# Patient Record
Sex: MALE | Race: WHITE | NOT HISPANIC OR LATINO | Employment: FULL TIME | URBAN - METROPOLITAN AREA
[De-identification: names, ages, dates, MRNs, and addresses within clinical notes are randomized per-mention and may not be internally consistent; named-entity substitution may affect disease eponyms.]

---

## 2023-04-24 ENCOUNTER — OFFICE VISIT (OUTPATIENT)
Dept: SURGERY | Facility: CLINIC | Age: 48
End: 2023-04-24
Payer: COMMERCIAL

## 2023-04-24 VITALS — WEIGHT: 220.6 LBS | BODY MASS INDEX: 31.58 KG/M2 | HEIGHT: 70 IN

## 2023-04-24 DIAGNOSIS — Z87.19 HISTORY OF BARRETT'S ESOPHAGUS: Primary | ICD-10-CM

## 2023-04-24 NOTE — PROGRESS NOTES
Cc: History of Ramsey's esophagus, need for screening colonoscopy    History of presenting illness:   This is a nice 47-year-old gentleman with a history of Ramsey's disease of the esophagus.  Last scope done greater than 5 years ago, he is not exactly sure what the results were but believes he was told he still had some benign Rmasey's changes.  Since that time he has had some substantial weight loss and his reflux has improved.  He has never had a colonoscopy.    Past Medical History: Ramsey's disease, gastroesophageal reflux disease    Past Surgical History: Bilateral open inguinal hernia repair with mesh 2007, open umbilical hernia repair with mesh (patient believes) 2016    Medications: Magnesium    Allergies: None known    Social History: Non-smoker    Family History: Negative for known colon or rectal cancer    Review of Systems:  Constitutional: Denies fever, chills, change in weight  Neck: no swollen glands or dysphagia or odynophagia  Respiratory: negative for SOB, cough, hemoptysis or wheezing  Cardiovascular: negative for chest pain, palpitations or peripheral edema  Gastrointestinal: Mild intermittent reflux controlled with over-the-counter Pepcid, denies change in bowel habits      Physical Exam:  BMI: 31.7  General: alert and oriented, appropriate, no acute distress  Eyes: No scleral icterus, extraocular movements are intact  Neck: Supple without lymphadenopathy or thyromegaly, trachea is in the midline  Respiratory: There is good bilateral chest expansion, no use of accessory muscles is noted  Cardiovascular: No jugular venous distention or peripheral edema is seen  Gastrointestinal: Soft and benign, no mass, no hernia    Laboratory data: No recent relevant data    Imaging data: No recent relevant data      Assessment and plan:   -Personal history of Ramsey's esophagus  -Need for colon screening  -Options discussed with patient.  I have recommended proceeding with upper and lower endoscopy.   Risks including bleeding and perforation discussed, patient is agreeable to proceed.      Naren Church MD, FACS  General, Minimally Invasive and Endoscopic Surgery  [facility]    [address], [address]  [place], [place], [address]  P: [phone]  F: [phone]

## 2023-04-25 ENCOUNTER — TELEPHONE (OUTPATIENT)
Dept: SURGERY | Facility: CLINIC | Age: 48
End: 2023-04-25
Payer: COMMERCIAL

## 2023-04-25 NOTE — TELEPHONE ENCOUNTER
EGD denied by patient's insurance(colonoscopy approved).  Jennifer's response:    Your doctor is checking you for a painful condition where acid from the stomach goes up towards your mouth (heartburn). Your doctor ordered a special test. This test uses a scope that goes through the mouth to look at the stomach and small bowel. This test should be used when your symptoms have not improved after eight weeks of treatment by your doctor. Treatment might include medications or changes in your diet. We reviewed the notes we have. The notes do not show that you have been treated that long for your symptoms. Based on the information we have, this test is not medically necessary. We used [employer] Clinical Guideline (CG-MED-59) titled Upper Gastrointestinal Endoscopy to make this decision.    Do you want to do peer to peer or just go forward with Colonoscopy?

## 2023-04-25 NOTE — TELEPHONE ENCOUNTER
The indication for the EGD is history of Ramsey's esophagus, not reflux.  If that does not convinced him to approve the procedure then I will be happy to do a peer to peer.

## 2023-04-27 ENCOUNTER — TELEPHONE (OUTPATIENT)
Dept: SURGERY | Facility: CLINIC | Age: 48
End: 2023-04-27
Payer: COMMERCIAL

## 2023-04-28 ENCOUNTER — PATIENT ROUNDING (BHMG ONLY) (OUTPATIENT)
Dept: SURGERY | Facility: CLINIC | Age: 48
End: 2023-04-28
Payer: COMMERCIAL

## 2023-04-28 NOTE — PROGRESS NOTES
April 28, 2023    Hello, may I speak with Rick Ruiz?    My name is BESSIE      I am  with [facility]  [address]  [facility] [place] [facility] [address] [phone].    Before we get started may I verify your date of birth? 1975    I am calling to officially welcome you to our practice and ask about your recent visit. Is this a good time to talk? yes    Tell me about your visit with us. What things went well?  VISIT WENT WELL       We're always looking for ways to make our patients' experiences even better. Do you have recommendations on ways we may improve?  no    Overall were you satisfied with your first visit to our practice? yes       I appreciate you taking the time to speak with me today. Is there anything else I can do for you? no      Thank you, and have a great day.